# Patient Record
Sex: FEMALE | Race: WHITE | Employment: STUDENT | URBAN - NONMETROPOLITAN AREA
[De-identification: names, ages, dates, MRNs, and addresses within clinical notes are randomized per-mention and may not be internally consistent; named-entity substitution may affect disease eponyms.]

---
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
## 2019-02-27 DIAGNOSIS — Z86.19 H/O COLD SORES: Primary | ICD-10-CM

## 2019-02-27 NOTE — TELEPHONE ENCOUNTER
LOV 6/20/2018    valACYclovir (VALTREX) 1000 mg tablet  Last refill: 8/7/2018 #14     Please advise.     Patti Arguello LPN on 2/27/2019 at 4:18 PM

## 2019-03-01 RX ORDER — VALACYCLOVIR HYDROCHLORIDE 1 G/1
1000 TABLET, FILM COATED ORAL 2 TIMES DAILY
Qty: 14 TABLET | Refills: 0 | Status: SHIPPED | OUTPATIENT
Start: 2019-03-01

## 2019-05-20 ENCOUNTER — ALLIED HEALTH/NURSE VISIT (OUTPATIENT)
Dept: ALLERGY | Facility: OTHER | Age: 19
End: 2019-05-20
Attending: PHYSICIAN ASSISTANT
Payer: COMMERCIAL

## 2019-05-20 DIAGNOSIS — Z11.1 VISIT FOR MANTOUX TEST: Primary | ICD-10-CM

## 2019-05-20 PROCEDURE — 86580 TB INTRADERMAL TEST: CPT

## 2019-05-20 NOTE — PROGRESS NOTES
The patient is asked the following questions today and these are her answers:    -Have you had a mantoux administered in the past 30 days?    No  -Have you had a previous positive Mantoux.  No  -Have you received BCG in the past.  No  -Have you had a live vaccine  (MMR, Varicella, OPV, Yellow Fever) in the last 6 weeks.  No  -Have you had and active  viral or bacterial infection in the past 6 weeks.  No  -Have you received corticosteroids or immunosuppressive agents in the past 6 weeks.  No  -Have you been diagnosed with HIV?  No  -Do you have a maglinancy?  No     Mantoux administered into Left Forearm at 0958. Patient will returnin 48-72 hours to have this read.    Agnes Joya

## 2019-05-22 ENCOUNTER — ALLIED HEALTH/NURSE VISIT (OUTPATIENT)
Dept: ALLERGY | Facility: OTHER | Age: 19
End: 2019-05-22
Attending: PHYSICIAN ASSISTANT
Payer: COMMERCIAL

## 2019-05-22 DIAGNOSIS — Z11.1 VISIT FOR MANTOUX TEST: Primary | ICD-10-CM

## 2019-05-22 LAB
PPDINDURATION: NORMAL MM (ref 0–5)
PPDREDNESS: NORMAL MM

## 2019-05-22 NOTE — PROGRESS NOTES
Result Date: 5/22/19  Result time: 10:01 a.m  Result status: Final    Negative reading.      Mantoux results: No induration.  No swelling.  No redness.    Desire Salinas

## 2019-06-10 ENCOUNTER — ALLIED HEALTH/NURSE VISIT (OUTPATIENT)
Dept: ALLERGY | Facility: OTHER | Age: 19
End: 2019-06-10
Attending: PHYSICIAN ASSISTANT
Payer: COMMERCIAL

## 2019-06-10 DIAGNOSIS — Z11.1 SCREENING FOR TUBERCULOSIS: Primary | ICD-10-CM

## 2019-06-10 PROCEDURE — 86580 TB INTRADERMAL TEST: CPT

## 2019-06-10 NOTE — PROGRESS NOTES
Prior to injection, verified patient identity using patient's name and date of birth.  Due to injection administration, patient instructed to remain in clinic for 15 minutes  afterwards, and to report any adverse reaction to me immediately.    [employer]    Drug Amount Wasted:  None.  Vial/Syringe: Multi dose vial  Expiration Date:  10/29/20  Pt instructed to return on 48-72 hours.  Niharika Duke LPN

## 2019-06-10 NOTE — NURSING NOTE
The patient is asked the following questions today and these are her answers:    -Have you had a mantoux administered in the past 30 days?    Yes  -Have you had a previous positive Mantoux.  No  -Have you received BCG in the past.  No  -Have you had a live vaccine  (MMR, Varicella, OPV, Yellow Fever) in the last 6 weeks.  No  -Have you had and active  viral or bacterial infection in the past 6 weeks.  No  -Have you received corticosteroids or immunosuppressive agents in the past 6 weeks.  No  -Have you been diagnosed with HIV?  No  -Do you have a maglinancy?  No

## 2019-06-12 ENCOUNTER — ALLIED HEALTH/NURSE VISIT (OUTPATIENT)
Dept: ALLERGY | Facility: OTHER | Age: 19
End: 2019-06-12
Attending: PHYSICIAN ASSISTANT
Payer: COMMERCIAL

## 2019-06-12 DIAGNOSIS — Z11.1 SCREENING FOR TUBERCULOSIS: Primary | ICD-10-CM

## 2019-06-12 LAB
PPDINDURATION: 0 MM (ref 0–5)
PPDREDNESS: 0 MM

## 2019-07-19 ENCOUNTER — OFFICE VISIT (OUTPATIENT)
Dept: OBGYN | Facility: OTHER | Age: 19
End: 2019-07-19
Attending: NURSE PRACTITIONER
Payer: COMMERCIAL

## 2019-07-19 VITALS
HEIGHT: 67 IN | SYSTOLIC BLOOD PRESSURE: 110 MMHG | BODY MASS INDEX: 23.23 KG/M2 | DIASTOLIC BLOOD PRESSURE: 64 MMHG | WEIGHT: 148 LBS | OXYGEN SATURATION: 98 % | HEART RATE: 62 BPM

## 2019-07-19 DIAGNOSIS — L70.0 ACNE VULGARIS: ICD-10-CM

## 2019-07-19 DIAGNOSIS — L70.9 ACNE: Primary | ICD-10-CM

## 2019-07-19 PROCEDURE — 99207 ZZC NO CHARGE NURSE ONLY: CPT | Performed by: NURSE PRACTITIONER

## 2019-07-19 RX ORDER — CLINDAMYCIN PHOSPHATE 10 MG/G
GEL TOPICAL
Qty: 30 G | Refills: 0 | Status: SHIPPED | OUTPATIENT
Start: 2019-07-19 | End: 2021-06-12

## 2019-07-19 ASSESSMENT — MIFFLIN-ST. JEOR: SCORE: 1473.95

## 2019-07-19 ASSESSMENT — PAIN SCALES - GENERAL: PAINLEVEL: NO PAIN (0)

## 2019-07-19 NOTE — NURSING NOTE
"Chief Complaint   Patient presents with     RECHECK     acne has paragard       Initial /64   Pulse 62   Ht 1.702 m (5' 7\")   Wt 67.1 kg (148 lb)   SpO2 98%   BMI 23.18 kg/m   Estimated body mass index is 23.18 kg/m  as calculated from the following:    Height as of this encounter: 1.702 m (5' 7\").    Weight as of this encounter: 67.1 kg (148 lb).  Medication Reconciliation: chris Cage      "

## 2019-07-19 NOTE — PROGRESS NOTES
Patient came in today for her acne. Gave her the number to [facility] so she could get scheduled there. No charge visit per Cheryle Jacome.

## 2019-07-31 ENCOUNTER — TELEPHONE (OUTPATIENT)
Dept: FAMILY MEDICINE | Facility: OTHER | Age: 19
End: 2019-07-31

## 2019-07-31 DIAGNOSIS — B37.31 YEAST VAGINITIS: Primary | ICD-10-CM

## 2019-07-31 NOTE — TELEPHONE ENCOUNTER
Pt called, reports suspected yeast infection. Reports vaginal discharge, odor, and irritation. Pt treated with OTC monistat on 7/26, 7/27, and 7/28. Pt reports that her symptoms have improved but have not completely resolved. She is wondering if she should try another course of monistat or if she would need PO diflucan. Please advise.

## 2019-08-01 RX ORDER — FLUCONAZOLE 150 MG/1
150 TABLET ORAL DAILY
Qty: 3 TABLET | Refills: 0 | Status: SHIPPED | OUTPATIENT
Start: 2019-08-01 | End: 2019-08-04

## 2019-08-01 NOTE — TELEPHONE ENCOUNTER
Left message for pt to return call. Please advise pt of new rx being sent to [facility] the clinic. Will take a few days to work

## 2020-07-17 ENCOUNTER — TELEPHONE (OUTPATIENT)
Dept: FAMILY MEDICINE | Facility: OTHER | Age: 20
End: 2020-07-17

## 2020-07-17 DIAGNOSIS — B36.9 FUNGAL DERMATITIS: Primary | ICD-10-CM

## 2020-07-17 NOTE — TELEPHONE ENCOUNTER
Patient called stating she has a yeast infection and was wondering if you would send her in diflucan again as you did this for her in the past. States a lot of itching and does get them often. Also has not tried anything OTC yet as usually uses Monistat but it is 20.00 and wanted to see if you would send in something first. Please call back on cell phone. [phone]

## 2020-07-22 RX ORDER — FLUCONAZOLE 150 MG/1
TABLET ORAL
Qty: 3 TABLET | Refills: 0 | Status: SHIPPED | OUTPATIENT
Start: 2020-07-22 | End: 2021-06-12

## 2020-07-22 NOTE — TELEPHONE ENCOUNTER
Called and notified patient that Diflucan was ordered and sent to pharmacy. Judi Del Valle LPN

## 2021-06-12 ENCOUNTER — HOSPITAL ENCOUNTER (EMERGENCY)
Facility: HOSPITAL | Age: 21
Discharge: HOME | End: 2021-06-12
Attending: NURSE PRACTITIONER | Admitting: NURSE PRACTITIONER
Payer: COMMERCIAL

## 2021-06-12 VITALS
TEMPERATURE: 99.9 F | HEART RATE: 92 BPM | OXYGEN SATURATION: 98 % | SYSTOLIC BLOOD PRESSURE: 119 MMHG | DIASTOLIC BLOOD PRESSURE: 71 MMHG | RESPIRATION RATE: 16 BRPM

## 2021-06-12 DIAGNOSIS — H66.002 ACUTE SUPPURATIVE OTITIS MEDIA OF LEFT EAR WITHOUT SPONTANEOUS RUPTURE OF TYMPANIC MEMBRANE, RECURRENCE NOT SPECIFIED: Primary | ICD-10-CM

## 2021-06-12 PROCEDURE — 99213 OFFICE O/P EST LOW 20 MIN: CPT | Performed by: NURSE PRACTITIONER

## 2021-06-12 PROCEDURE — G0463 HOSPITAL OUTPT CLINIC VISIT: HCPCS

## 2021-06-12 RX ORDER — AMOXICILLIN 875 MG
875 TABLET ORAL 2 TIMES DAILY
Qty: 20 TABLET | Refills: 0 | Status: SHIPPED | OUTPATIENT
Start: 2021-06-12 | End: 2021-06-22

## 2021-06-12 NOTE — DISCHARGE INSTRUCTIONS
Take antibiotic as prescribed until finished.  Continue taking Tylenol or ibuprofen as needed for the pain.    Follow-up with your doctor as needed if no improvement in symptoms.    Return to emergency department for worsening or concerning symptoms.

## 2021-06-12 NOTE — ED PROVIDER NOTES
History     Chief Complaint   Patient presents with     Otalgia     HPI  Laura Santiago is a 21 year old female who presents to urgent care for left ear pain.  Onset yesterday.  No ear drainage.  No known fevers.  No sinus pressure pain, cough, rhinorrhea.  The patient reports a history of frequent ear infections with most recent one being in the last couple of years.  No known ear surgeries.    Allergies:  No Known Allergies    Problem List:    Patient Active Problem List    Diagnosis Date Noted     Asthma, intermittent, uncomplicated 06/20/2018     Priority: Medium     Adenoid hypertrophy 03/18/2013     Priority: Medium     Conductive hearing loss of both ears 03/18/2013     Priority: Medium     Chronic otitis media with effusion 03/18/2013     Priority: Medium        Past Medical History:    Past Medical History:   Diagnosis Date     Acute bronchitis 01/25/2001     Acute upper respiratory infections of unspecified site 1999     Bronchitis, not specified as acute or chronic 02/25/2000     Concussion with no loss of consciousness 08/07/2003     Exercise induced bronchospasm 01/01/2011     Impacted cerumen 11/06/2011     Need for prophylactic vaccination and inoculation against influenza 02/15/2007     Other follow-up examination(V67.59) 12/19/2001     Routine infant or child health check 01/03/2000     Uncomplicated asthma      Unspecified otitis media 10/04/2001       Past Surgical History:    Past Surgical History:   Procedure Laterality Date     MYRINGOTOMY, INSERT TUBE(S), ADENOIDECTOMY, COMBINED  3/28/2013    Procedure: COMBINED MYRINGOTOMY, INSERT TUBE(S), ADENOIDECTOMY;  ADENOIDECTOMY AND INSERTION OF BILATERAL TYMPANOSTOMY TUBES;  Surgeon: Carolee Haynes DO;  Location: [facility]     NO SURGICAL HISTORY         Family History:    Family History   Problem Relation Age of Onset     Asthma Sister        Social History:  Marital Status:  Single [1]  Social History     Tobacco Use     Smoking  status: Never Smoker     Smokeless tobacco: Never Used   Substance Use Topics     Alcohol use: No     Drug use: No        Medications:    amoxicillin (AMOXIL) 875 MG tablet  paragard intrauterine copper  albuterol (VENTOLIN HFA) 108 (90 BASE) MCG/ACT Inhaler  valACYclovir (VALTREX) 1000 mg tablet          Review of Systems   Constitutional: Negative for chills and fever.   HENT: Positive for ear pain. Negative for ear discharge.    All other systems reviewed and are negative.      Physical Exam   BP: 119/71  Pulse: 92  Temp: 99.9  F (37.7  C)  Resp: 16  SpO2: 98 %      Physical Exam  Vitals signs and nursing note reviewed.   Constitutional:       Appearance: Normal appearance. She is not ill-appearing or toxic-appearing.   HENT:      Head: Normocephalic and atraumatic.      Jaw: No trismus.      Right Ear: Ear canal normal. There is no impacted cerumen. Tympanic membrane is scarred.      Left Ear: No drainage or swelling. There is no impacted cerumen. Tympanic membrane is erythematous and bulging. Tympanic membrane is not perforated.      Nose: Nose normal.      Mouth/Throat:      Mouth: Mucous membranes are moist.   Eyes:      Pupils: Pupils are equal, round, and reactive to light.   Neck:      Musculoskeletal: Neck supple.   Cardiovascular:      Rate and Rhythm: Normal rate and regular rhythm.      Heart sounds: Normal heart sounds.   Pulmonary:      Effort: Pulmonary effort is normal. No respiratory distress.      Breath sounds: Normal breath sounds.   Musculoskeletal: Normal range of motion.   Lymphadenopathy:      Cervical: No cervical adenopathy.   Skin:     General: Skin is warm and dry.   Neurological:      Mental Status: She is alert and oriented to person, place, and time.         ED Course        Procedures               No results found for this or any previous visit (from the past 24 hour(s)).    Medications - No data to display    Assessments & Plan (with Medical Decision Making)     I have reviewed  the nursing notes.    I have reviewed the findings, diagnosis, plan and need for follow up with the patient.  21-year-old female that presented for evaluation of left ear pain.  Left TM erythematous and bulging.  TM intact.  Vital signs stable.  Amoxicillin as prescribed for left ear infection.  Follow-up with PCP as needed if no improvement in symptoms.  Return to ED/UC for concerning symptoms.  Patient verbalized understanding.    This document was prepared using a combination of typing and voice generated software.  While every attempt was made for accuracy, spelling and grammatical errors may exist.    Discharge Medication List as of 6/12/2021 12:36 PM      START taking these medications    Details   amoxicillin (AMOXIL) 875 MG tablet Take 1 tablet (875 mg) by mouth 2 times daily for 10 days, Disp-20 tablet, R-0, E-Prescribe             Final diagnoses:   Acute suppurative otitis media of left ear without spontaneous rupture of tympanic membrane, recurrence not specified       6/12/2021   [facility]     Mpofu, Prudence, CNP  06/15/21 1144

## 2021-06-15 ASSESSMENT — ENCOUNTER SYMPTOMS
CHILLS: 0
FEVER: 0

## 2021-08-13 ENCOUNTER — OFFICE VISIT (OUTPATIENT)
Dept: OBGYN | Facility: OTHER | Age: 21
End: 2021-08-13
Attending: NURSE PRACTITIONER
Payer: COMMERCIAL

## 2021-08-13 VITALS
HEART RATE: 82 BPM | DIASTOLIC BLOOD PRESSURE: 68 MMHG | WEIGHT: 165 LBS | SYSTOLIC BLOOD PRESSURE: 118 MMHG | BODY MASS INDEX: 25.9 KG/M2 | OXYGEN SATURATION: 99 % | HEIGHT: 67 IN

## 2021-08-13 DIAGNOSIS — Z30.431 IUD CHECK UP: Primary | ICD-10-CM

## 2021-08-13 PROCEDURE — 99213 OFFICE O/P EST LOW 20 MIN: CPT | Performed by: NURSE PRACTITIONER

## 2021-08-13 ASSESSMENT — MIFFLIN-ST. JEOR: SCORE: 1541.07

## 2021-08-13 ASSESSMENT — PAIN SCALES - GENERAL: PAINLEVEL: NO PAIN (0)

## 2021-08-13 NOTE — NURSING NOTE
"Chief Complaint   Patient presents with     Contraception       Initial /68 (BP Location: Left arm, Patient Position: Sitting, Cuff Size: Adult Regular)   Pulse 82   Ht 1.702 m (5' 7\")   Wt 74.8 kg (165 lb)   SpO2 99%   BMI 25.84 kg/m   Estimated body mass index is 25.84 kg/m  as calculated from the following:    Height as of this encounter: 1.702 m (5' 7\").    Weight as of this encounter: 74.8 kg (165 lb).  Medication Reconciliation: complete  Tayo Vale    "

## 2021-08-13 NOTE — PROGRESS NOTES
"[facility]                HPI   Laura presents for concerns of possible IUD misplacement.  She states that  Her boyfriend could feel the IUD which is not usual since placement about 4 years ago.  She denies cramping, pelvic pain, or vaginal discharge.  Periods are regular.              Medications:     Current Outpatient Medications Ordered in Epic   Medication     albuterol (VENTOLIN HFA) 108 (90 BASE) MCG/ACT Inhaler     paragard intrauterine copper     valACYclovir (VALTREX) 1000 mg tablet     No current Epic-ordered facility-administered medications on file.                Allergies:   Patient has no known allergies.         Review of Systems:   CONSTITUTIONAL: NEGATIVE for fever, chills, change in weight  : normal menstrual cycles, negative for, dysparunia, sexually transmitted disease and vaginal discharge                     Physical Exam:   Blood pressure 118/68, pulse 82, height 1.702 m (5' 7\"), weight 74.8 kg (165 lb), SpO2 99 %.  Constitutional:   awake, alert, cooperative, no apparent distress, and appears stated age     Genitounirinary:   External Genitalia:  General appearance; normal  Vagina:  Discharge absent, Lesions absent  Cervix:  Discharge absent, Tenderness absent, IUD string present and appears appropriately placed.  Uterus:  Size normal, Tenderness absent  Adenexa:  Tenderness absent              Assessment and Plan:   IUD check - doing well.  Appropriately placed.  Follow up annually and as needed.      Cheryle Jacome NP, CFNP  8/13/2021  3:01 PM  "